# Patient Record
(demographics unavailable — no encounter records)

---

## 2024-10-31 NOTE — REVIEW OF SYSTEMS
[As Noted in HPI] : as noted in HPI [Constipation] : constipation [Negative] : Heme/Lymph [Bad Breath] : bad breath [FreeTextEntry7] : chronic constipation with straining during bowel movements

## 2024-10-31 NOTE — REASON FOR VISIT
[Initial Evaluation] : an initial evaluation [FreeTextEntry1] : Chronic Constipation, Chronic Halitosis

## 2024-10-31 NOTE — HISTORY OF PRESENT ILLNESS
[FreeTextEntry1] : Thank you for consult.  Patient is a 52 y/o male with a PMHx of hyperlipidemia, nasal polyps, Nephrolithiasis, vitamin D Deficiency, and Chronic Constipation who presents c/o chronic constipation with straining during bowel movements and chronic halitosis x several years. FHx is significant for colon cancer in the mother and the paternal grandmother. Pt denies abdominal pain, rectal bleeding. As per pt, last EGD was > 10 years ago at an external facility, Pt has never had a screening colonoscopy.

## 2024-10-31 NOTE — ASSESSMENT
[FreeTextEntry1] : Recommended OTC Miralax once daily for constipation. Referred pt for EGD-Colonoscopy. Pt expressed understanding and agrees with the plan.  The causes of constipation were discussed at length. We discussed: Eat three meals each day. Do not skip meals. Gradually increase the amount of FIBER in your diet. Choose more whole grain breads, cereals, and rice. Select more raw fruits and vegetables and eat the peel, if appropriate. Read food labels and look for the "dietary fiber" content of foods. Good sources have 2 grams of fiber or more. Drink six to eight glasses of water each day. Limit highly refined and processed foods.  An upper GI endoscopy or EGD (esophagogastroduodenoscopy) is a procedure to diagnose and treat problems in your upper GI (gastrointestinal) tract.  The upper GI tract includes your food pipe (esophagus), stomach, and the first part of your small intestine (the duodenum).  This procedure is done using a long, flexible tube called an endoscope. The tube has a tiny light and video camera on one end. The tube is put into your mouth and throat. Then it is slowly pushed through your esophagus and stomach, and into your duodenum. Video images from the tube are seen on a monitor.  Small tools may also be inserted into the endoscope. These tools can be used to:  Take tissue samples for a biopsy Remove things such as food that may be stuck in the upper GI tract Inject air or fluid Stop bleeding do procedures such as endoscopic surgery, laser therapy, or open (dilate) a narrowed area  A colonoscopy is an exam of the lower part of the gastrointestinal tract, which is called the colon or large intestine (bowel). Colonoscopy is a safe procedure that provides information that other tests may not be able to give.  Colonoscopy is performed by inserting a device called a colonoscope into the anus and advancing through the entire colon. The procedure generally takes between 20 minutes and one hour.  Other tests that are sometimes used to screen for colon cancer, like virtual colonoscopy (also called CT colonography), were discussed separately.  REASONS FOR COLONOSCOPY:  The most common reasons for colonoscopy are:  1. To screen for colon polyps (growths of tissue in the colon) or colon cancer  2. Rectal bleeding  3. A change in bowel habits, like persistent diarrhea  4. Iron deficiency anemia (a decrease in blood count due to loss of iron)  5. A family history of colon cancer  6. A personal history of colon polyps or colon cancer  7. Chronic, unexplained abdominal or rectal pain  8. An abnormal X-Ray exam, like a barium enema or CT scan.   COLONOSCOPY PREPARATION: Before colonoscopy, your colon must be completely cleaned out so that the doctor can see any abnormal areas. This is vitally important to increase the chances that your doctor will identify abnormalities in your colon. If your colon is not completely cleaned out, the chances your doctor will miss abnormalities increases. Your doctor's office will provide specific instructions about how you should prepare for your colonoscopy. Be sure to read these instructions as soon as you get them so you will know how to take the preparation and whether you need to make any changes to your medications or diet. If you have questions, call the doctor's office in advance.  I spent 45 minutes with the patient as well as reviewing documents prior to and after the office visit. Patient verbalized understanding of all information provided. All questions answered and reviewed.  Robert Brunner, MD

## 2024-10-31 NOTE — PHYSICAL EXAM
[Alert] : alert [Normal Voice/Communication] : normal voice/communication [Healthy Appearing] : healthy appearing [No Acute Distress] : no acute distress [Sclera] : the sclera and conjunctiva were normal [Hearing Threshold Finger Rub Not [place]] : hearing was normal [Normal Lips/Gums] : the lips and gums were normal [Oropharynx] : the oropharynx was normal [Normal Appearance] : the appearance of the neck was normal [No Neck Mass] : no neck mass was observed [No Respiratory Distress] : no respiratory distress [No Acc Muscle Use] : no accessory muscle use [Respiration, Rhythm And Depth] : normal respiratory rhythm and effort [Auscultation Breath Sounds / Voice Sounds] : lungs were clear to auscultation bilaterally [Heart Rate And Rhythm] : heart rate was normal and rhythm regular [Normal S1, S2] : normal S1 and S2 [Murmurs] : no murmurs [Bowel Sounds] : normal bowel sounds [Abdomen Tenderness] : non-tender [No Masses] : no abdominal mass palpated [Abdomen Soft] : soft [] : no hepatosplenomegaly [Oriented To Time, Place, And Person] : oriented to person, place, and time

## 2024-12-02 NOTE — ASSESSMENT
[FreeTextEntry1] : #Lentigines  #Multiple melanocytic nevi, benign  Screening exam for skin cancer - benign findings as above  - TBSE performed today - Advised sun protection.  Recommended OTC sunscreen products, including SPF30+ with broadband UV protection as well as proper use.  Discussed OTC sun protective clothing - Counseled patient to monitor for changes, including ABCDEs of mole monitoring - Discussed self-skin exams - rtc q1yr for repeat skin exam or sooner if new/concerning lesion   #Dupuytren's contracture - Referral to hand surgery   RTC 6 mo for FBSE given multiple moles

## 2024-12-02 NOTE — HISTORY OF PRESENT ILLNESS
[FreeTextEntry1] : NPV: moles  [de-identified] : ROMANA NEGRETE is a 51 year old who is presenting for evaluation of:   1. Moles throughout body, none changing or symptomatic. Requesting TBSE.  2. Tightening on hand, works as a    Skin Cancer Hx: none  FH of skin cancer: none  Social hx: Here with son

## 2024-12-02 NOTE — PHYSICAL EXAM
[FreeTextEntry3] : Full body skin exam was performed. The patient is well-appearing, in no acute distress, alert and oriented x 3. Mood and affect are normal. A complete cutaneous examination of the scalp, face, neck, chest, abdomen, back, bilateral arms, bilateral legs, buttocks, digits, nails, eyelids, conjunctiva and lips reveals the following significant findings: -fairly uniform and regular brown macules and papules on the trunk and extremities

## 2024-12-02 NOTE — HISTORY OF PRESENT ILLNESS
[FreeTextEntry1] : NPV: moles  [de-identified] : ROMANA NEGRETE is a 51 year old who is presenting for evaluation of:   1. Moles throughout body, none changing or symptomatic. Requesting TBSE.  2. Tightening on hand, works as a    Skin Cancer Hx: none  FH of skin cancer: none  Social hx: Here with son

## 2025-01-02 NOTE — ASSESSMENT
[FreeTextEntry1] :  I have discussed the underlying pathophysiology of the patient's condition. Treatment options were discussed including; observation, NSAIDS, analgesics, bracing, injection(s), physical therapy, and surgical intervention. The patient wishes to proceed with a cortisone injection at this time. The skin was prepped with alcohol and sprayed with Ethyl Chloride. An injection of 0.5 cc 1% Lidocaine without epinephrine, 0.25 cc Kenalog 40mg, and 0.25 cc Dexamethasone was administered into the flexor tendon sheath of the. The patient tolerated the procedure well. Apply ice.  Patient should follow up in 4 weeks.

## 2025-01-02 NOTE — PHYSICAL EXAM
[de-identified] : WDWN adult male. His family is present.  Left palm : Subcutaneous tender palm nodule overlying ring finger A1 pulley FROM Normal sensation [de-identified] :  AP, lateral and oblique views of the left hand were obtained today and revealed no  abnormality

## 2025-01-02 NOTE — HISTORY OF PRESENT ILLNESS
[FreeTextEntry1] : 51 year old RHD male  presents for evaluation of Left palmar mass. 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 He has no pain to palpation, denies redness or drainage. He does have pain when gripping with force. Denies n/t or locking of digits.

## 2025-01-02 NOTE — PHYSICAL EXAM
[de-identified] : WDWN adult male. His family is present.  Left palm : Subcutaneous tender palm nodule overlying ring finger A1 pulley FROM Normal sensation [de-identified] :  AP, lateral and oblique views of the left hand were obtained today and revealed no  abnormality

## 2025-01-02 NOTE — ADDENDUM
[FreeTextEntry1] :  I, Eileen Ponce, acted solely as a scribe for Dr. Oj Betancourt on this date 01/02/2025 .  All medical record entries made by the Scribe were at my, Dr. Oj Betancourt, direction and personally dictated by me on 01/02/2025 I have reviewed the chart and agree that the record accurately reflects my personal performance of the history, physical exam, assessment and plan. I have also personally directed, reviewed, and agreed with the chart.

## 2025-01-02 NOTE — DISCUSSION/SUMMARY
[FreeTextEntry1] : The patient wishes to proceed with a cortisone injection at this time. The skin was prepped with alcohol and sprayed with Ethyl Chloride. An injection of 0.5 cc 1% Lidocaine without epinephrine, 0.25 cc Kenalog 40mg, and 0.25 cc Dexamethasone was administered into the subcutaneous area over the nodule. The patient tolerated the procedure well. Apply ice.  Return in 4 weeks if there is no improvement.

## 2025-06-28 NOTE — HISTORY OF PRESENT ILLNESS
[de-identified] : Patient is a 52 year old M with a PMHx of HLD, Nephrolithiasis and nasal polyps coming in for annual physical.  Patient reports that he feels well currently but, does not some palpitations on occasion.

## 2025-06-28 NOTE — HEALTH RISK ASSESSMENT
[Yes] : Yes [Monthly or less (1 pt)] : Monthly or less (1 point) [1 or 2 (0 pts)] : 1 or 2 (0 points) [Never (0 pts)] : Never (0 points) [No] : In the past 12 months have you used drugs other than those required for medical reasons? No [0] : 2) Feeling down, depressed, or hopeless: Not at all (0) [PHQ-2 Negative - No further assessment needed] : PHQ-2 Negative - No further assessment needed [Current] : Current [0-4] : 0-4 [Audit-CScore] : 1 [DKS2Tboww] : 0 [de-identified] : 40 year smoker